# Patient Record
(demographics unavailable — no encounter records)

---

## 2024-12-17 NOTE — HISTORY OF PRESENT ILLNESS
[de-identified] :  VINCENT LOMBARDO is a 69 year male presenting 1 year s/p right total hip replacement. He reports having mild stiffness and discomfort near the incision but no constant pain. The patient reports continuing a home exercise routine. The patient has returned to daily activities of life without significant pain or discomfort. Overall, the patient is very happy with the result of the surgery.

## 2024-12-17 NOTE — PHYSICAL EXAM
[de-identified] :  The patient appears well nourished and in no apparent distress. The patient is alert and oriented to person, place, and time. Affect and mood appear normal. The head is normocephalic and atraumatic. The eyes reveal normal sclera and extra ocular muscles are intact. The tongue is midline with no apparent lesions. Skin shows normal turgor with no evidence of eczema or psoriasis. No respiratory distress noted. Sensation grossly intact. [de-identified] :  Exam of the right hip shows a well healed incision, hip flexion of 120 degrees, hip external rotation of 50 degrees, hip internal rotation of 30 degrees.  5/5 motor strength bilaterally distally. Sensation intact distally. [de-identified] : X-ray: AP of the pelvis and 2 views of the right hip demonstrate a right total hip arthroplasty in stable position, with no evidence of fracture, loosening, or dislocation.

## 2024-12-17 NOTE — REASON FOR VISIT
[Follow-Up Visit] : a follow-up visit for [Other: ____] : [unfilled] [FreeTextEntry2] : S/P right robotic-assisted anterior THR with ABBY; DOS: 12/21/23.

## 2024-12-17 NOTE — DISCUSSION/SUMMARY
[de-identified] :  The patient is doing very well 1 year following right total hip replacement. The patient will continue their home exercises. His discomfort may be due to nerve sensitivity along the incision which may resolve overtime. Overall the patient is very happy with the outcome of the surgery. Dental prophylaxis was reviewed. Follow up in 5 years for radiographic surveillance.

## 2025-03-13 NOTE — HISTORY OF PRESENT ILLNESS
[FreeTextEntry1] : Patient presents for skin examination. [de-identified] : Denies new, changing, bleeding or tender lesions on the skin over the past 6 months.

## 2025-03-13 NOTE — PHYSICAL EXAM
[Alert] : alert [Oriented x 3] : ~L oriented x 3 [Well Nourished] : well nourished [Full Body Skin Exam Performed] : performed [FreeTextEntry3] : A full skin exam was performed including the scalp, face (including lips, ears, nose and eyes), neck, chest, abdomen, back, buttocks, upper extremities and lower extremities.  The patient declined examination of the genitalia.   The exam revealed the following benign growths: Seborrheic keratoses. Lentigines.  keratotic papules, left forearm x 2 and right dorsal hand.